# Patient Record
Sex: FEMALE | Race: WHITE | NOT HISPANIC OR LATINO | Employment: OTHER | URBAN - METROPOLITAN AREA
[De-identification: names, ages, dates, MRNs, and addresses within clinical notes are randomized per-mention and may not be internally consistent; named-entity substitution may affect disease eponyms.]

---

## 2018-12-13 ENCOUNTER — HOSPITAL ENCOUNTER (OUTPATIENT)
Dept: MAMMOGRAPHY | Facility: CLINIC | Age: 43
Discharge: HOME | End: 2018-12-13
Attending: OBSTETRICS & GYNECOLOGY

## 2018-12-13 DIAGNOSIS — Z12.31 ENCOUNTER FOR SCREENING MAMMOGRAM FOR BREAST CANCER: ICD-10-CM

## 2020-08-19 ENCOUNTER — HOSPITAL ENCOUNTER (OUTPATIENT)
Dept: MAMMOGRAPHY | Facility: CLINIC | Age: 45
Discharge: HOME | End: 2020-08-19

## 2020-08-19 DIAGNOSIS — Z12.31 VISIT FOR SCREENING MAMMOGRAM: ICD-10-CM

## 2020-10-23 ENCOUNTER — OFFICE VISIT - HEALTHEAST (OUTPATIENT)
Dept: INTERNAL MEDICINE | Facility: CLINIC | Age: 45
End: 2020-10-23

## 2020-10-23 DIAGNOSIS — O24.415 GESTATIONAL DIABETES MELLITUS (GDM) CONTROLLED ON ORAL HYPOGLYCEMIC DRUG, ANTEPARTUM: ICD-10-CM

## 2020-10-23 DIAGNOSIS — E07.9 THYROID LUMP: ICD-10-CM

## 2020-10-23 DIAGNOSIS — Z00.00 ROUTINE GENERAL MEDICAL EXAMINATION AT A HEALTH CARE FACILITY: ICD-10-CM

## 2020-10-23 LAB
ANION GAP SERPL CALCULATED.3IONS-SCNC: 10 MMOL/L (ref 5–18)
BASOPHILS # BLD AUTO: 0 THOU/UL (ref 0–0.2)
BASOPHILS NFR BLD AUTO: 0 % (ref 0–2)
BUN SERPL-MCNC: 15 MG/DL (ref 8–22)
CALCIUM SERPL-MCNC: 9.3 MG/DL (ref 8.5–10.5)
CHLORIDE BLD-SCNC: 105 MMOL/L (ref 98–107)
CO2 SERPL-SCNC: 26 MMOL/L (ref 22–31)
CREAT SERPL-MCNC: 0.74 MG/DL (ref 0.6–1.1)
EOSINOPHIL # BLD AUTO: 0.2 THOU/UL (ref 0–0.4)
EOSINOPHIL NFR BLD AUTO: 2 % (ref 0–6)
ERYTHROCYTE [DISTWIDTH] IN BLOOD BY AUTOMATED COUNT: 11.3 % (ref 11–14.5)
GFR SERPL CREATININE-BSD FRML MDRD: >60 ML/MIN/1.73M2
GLUCOSE BLD-MCNC: 96 MG/DL (ref 70–125)
HCT VFR BLD AUTO: 39.9 % (ref 35–47)
HGB BLD-MCNC: 13.2 G/DL (ref 12–16)
LYMPHOCYTES # BLD AUTO: 2.7 THOU/UL (ref 0.8–4.4)
LYMPHOCYTES NFR BLD AUTO: 33 % (ref 20–40)
MCH RBC QN AUTO: 31.6 PG (ref 27–34)
MCHC RBC AUTO-ENTMCNC: 33.1 G/DL (ref 32–36)
MCV RBC AUTO: 96 FL (ref 80–100)
MONOCYTES # BLD AUTO: 0.4 THOU/UL (ref 0–0.9)
MONOCYTES NFR BLD AUTO: 4 % (ref 2–10)
NEUTROPHILS # BLD AUTO: 4.9 THOU/UL (ref 2–7.7)
NEUTROPHILS NFR BLD AUTO: 61 % (ref 50–70)
PLATELET # BLD AUTO: 203 THOU/UL (ref 140–440)
PMV BLD AUTO: 7.9 FL (ref 7–10)
POTASSIUM BLD-SCNC: 3.9 MMOL/L (ref 3.5–5)
RBC # BLD AUTO: 4.17 MILL/UL (ref 3.8–5.4)
SODIUM SERPL-SCNC: 141 MMOL/L (ref 136–145)
TSH SERPL DL<=0.005 MIU/L-ACNC: 0.57 UIU/ML (ref 0.3–5)
WBC: 8.2 THOU/UL (ref 4–11)

## 2020-10-23 ASSESSMENT — MIFFLIN-ST. JEOR: SCORE: 1417.22

## 2021-01-20 ENCOUNTER — OFFICE VISIT - HEALTHEAST (OUTPATIENT)
Dept: INTERNAL MEDICINE | Facility: CLINIC | Age: 46
End: 2021-01-20

## 2021-01-20 DIAGNOSIS — F41.1 GENERALIZED ANXIETY DISORDER: ICD-10-CM

## 2021-01-20 DIAGNOSIS — G47.01 INSOMNIA DUE TO MEDICAL CONDITION: ICD-10-CM

## 2021-01-20 ASSESSMENT — ANXIETY QUESTIONNAIRES
IF YOU CHECKED OFF ANY PROBLEMS ON THIS QUESTIONNAIRE, HOW DIFFICULT HAVE THESE PROBLEMS MADE IT FOR YOU TO DO YOUR WORK, TAKE CARE OF THINGS AT HOME, OR GET ALONG WITH OTHER PEOPLE: VERY DIFFICULT
4. TROUBLE RELAXING: NEARLY EVERY DAY
3. WORRYING TOO MUCH ABOUT DIFFERENT THINGS: NEARLY EVERY DAY
1. FEELING NERVOUS, ANXIOUS, OR ON EDGE: NEARLY EVERY DAY
5. BEING SO RESTLESS THAT IT IS HARD TO SIT STILL: NEARLY EVERY DAY
4. TROUBLE RELAXING: NEARLY EVERY DAY
6. BECOMING EASILY ANNOYED OR IRRITABLE: NEARLY EVERY DAY
2. NOT BEING ABLE TO STOP OR CONTROL WORRYING: NEARLY EVERY DAY
3. WORRYING TOO MUCH ABOUT DIFFERENT THINGS: NEARLY EVERY DAY
GAD7 TOTAL SCORE: 21
1. FEELING NERVOUS, ANXIOUS, OR ON EDGE: NEARLY EVERY DAY
6. BECOMING EASILY ANNOYED OR IRRITABLE: NEARLY EVERY DAY
2. NOT BEING ABLE TO STOP OR CONTROL WORRYING: NEARLY EVERY DAY
7. FEELING AFRAID AS IF SOMETHING AWFUL MIGHT HAPPEN: NEARLY EVERY DAY
GAD7 TOTAL SCORE: 21
7. FEELING AFRAID AS IF SOMETHING AWFUL MIGHT HAPPEN: NEARLY EVERY DAY
5. BEING SO RESTLESS THAT IT IS HARD TO SIT STILL: NEARLY EVERY DAY

## 2021-01-25 ENCOUNTER — OFFICE VISIT - HEALTHEAST (OUTPATIENT)
Dept: INTERNAL MEDICINE | Facility: CLINIC | Age: 46
End: 2021-01-25

## 2021-01-25 DIAGNOSIS — F41.1 GENERALIZED ANXIETY DISORDER: ICD-10-CM

## 2021-02-11 ENCOUNTER — COMMUNICATION - HEALTHEAST (OUTPATIENT)
Dept: INTERNAL MEDICINE | Facility: CLINIC | Age: 46
End: 2021-02-11

## 2021-02-11 DIAGNOSIS — F41.1 GENERALIZED ANXIETY DISORDER: ICD-10-CM

## 2021-02-11 DIAGNOSIS — G47.01 INSOMNIA DUE TO MEDICAL CONDITION: ICD-10-CM

## 2021-02-12 RX ORDER — CITALOPRAM HYDROBROMIDE 10 MG/1
TABLET ORAL
Qty: 90 TABLET | Refills: 3 | Status: SHIPPED | OUTPATIENT
Start: 2021-02-12

## 2021-02-12 RX ORDER — MIRTAZAPINE 7.5 MG/1
TABLET, FILM COATED ORAL
Qty: 45 TABLET | Refills: 3 | Status: SHIPPED | OUTPATIENT
Start: 2021-02-12

## 2021-05-26 ENCOUNTER — HOSPITAL ENCOUNTER (OUTPATIENT)
Dept: ULTRASOUND IMAGING | Facility: CLINIC | Age: 46
Discharge: HOME | End: 2021-05-26
Payer: COMMERCIAL

## 2021-05-26 DIAGNOSIS — E07.9 THYROID LUMP: ICD-10-CM

## 2021-05-27 ENCOUNTER — RECORDS - HEALTHEAST (OUTPATIENT)
Dept: ADMINISTRATIVE | Facility: CLINIC | Age: 46
End: 2021-05-27

## 2021-05-28 ASSESSMENT — ANXIETY QUESTIONNAIRES
GAD7 TOTAL SCORE: 21
GAD7 TOTAL SCORE: 21

## 2021-06-04 VITALS
SYSTOLIC BLOOD PRESSURE: 111 MMHG | HEIGHT: 67 IN | BODY MASS INDEX: 25.87 KG/M2 | WEIGHT: 164.8 LBS | HEART RATE: 74 BPM | DIASTOLIC BLOOD PRESSURE: 78 MMHG

## 2021-06-12 NOTE — PROGRESS NOTES
FEMALE PREVENTATIVE EXAM    Assessment and Plan:     Patient has been advised of split billing requirements and indicates understanding: Yes    Caitie was seen today for annual exam and establish care.    Routine general medical examination at a health care facility  -     [facility](CBC and Differential)  -     Basic Metabolic Panel    Thyroid lump: Patient has the sensation of there being a lump in her lower anterior neck when she swallows. I cannot feel thyromegaly, but would like to review for thyroid enlargment.  -     Thyroid Stimulating Hormone (TSH)  -     US Thyroid; Future to review for thyroid mass. TSH to review for any thyroid function abnormalities.      Next follow up:  No follow-ups on file.    Immunization Review  Adult Imm Review: No immunizations due today    I discussed the following with the patient:   Adult Healthy Living: Importance of regular exercise  Healthy nutrition  Weight loss referral options        Subjective:   Chief Complaint: Caitie Xiong is an 45 y.o. female here for a preventative health visit.  {Split Bill scripting  The purpose of this visit is to discuss your medical history and prevent health problems before you are sick. You may be responsible for a co-pay, coinsurance, or deductible if your visit today includes services such as checking on a sore throat, having an x-ray or lab test, or treating and evaluating a new or existing condition :[id number]  Patient has been advised of split billing requirements and indicates understanding: Yes    HPI:    Here for annual exam, and to establish primary care.  She says she is a healthy person.  She does feel like there is a lump in her throat when she swallows, for the last month.  She also feels a pulse in her left upper arm.  She has no trouble swallowing.  She has had heartburn in the past, was on omeprazole for a year, stopped this.  She has not had any symptoms since.  She had an IUD placed for heavy periods.      Healthy Habits  Are  you taking a daily aspirin? Yes  Do you typically exercising at least 40 min, 3-4 times per week?  NO  Do you usually eat at least 4 servings of fruit and vegetables a day, include whole grains and fiber and avoid regularly eating high fat foods? Yes  Have you had an eye exam in the past two years? Yes  Do you see a dentist twice per year? Yes  Do you have any concerns regarding sleep? YES, troubles staying asleep     Safety Screen  If you own firearms, are they secured in a locked gun cabinet or with trigger locks? Yes  Do you feel you are safe where you are living?: Yes (10/23/2020  3:22 PM)  Do you feel you are safe in your relationship(s)?: Yes (10/23/2020  3:22 PM)      Review of Systems:  Please see above.  The rest of the review of systems are negative for all systems.     Pap History:   No - age 30-65 PAP every 3 years recommended   Patient has had a papsmear at gynecology 1 1/2 or 2 years ago. She has not had any abnormals. Done at [facility].  She had IUD placed for heavy periods.  This has not really helped her periods.  They are either March, or she has 10 days of periods.  Cancer Screening       Status Date      PAP SMEAR Next Due 10/26/2021      Done 10/26/2018 Gynecologist,          Patient Care Team:  Teresita Oswald MD as PCP - General (Internal Medicine)        History     Reviewed By Date/Time Sections Reviewed    Teresita Oswald MD 10/26/2020 12:54 AM Surgical    Teresita Oswald MD 10/26/2020 12:53 AM Family    Teresita Oswald MD 10/26/2020 12:46 AM Social Documentation    Ishan Khan, DASIA 10/23/2020  3:24 PM Tobacco        Past Medical History:   Diagnosis Date     Gestational diabetes mellitus (GDM) controlled on oral hypoglycemic drug, antepartum        Past Surgical History:   Procedure Laterality Date     cesarian section       INCONTINENCE SURGERY       TUBAL LIGATION       Patient has had her tubes tied.  She had a bladder sling after her  "son was born.    Social History     Social History Narrative    She has 3 children, 16, 14, 10.  She is , she lives with her .  She is an RN, works at [employer].     Social History     Social History Narrative    She has 3 children, 16, 14, 10.  She is , she lives with her .  She is an RN, works at [employer]. Never smoker, couple of glasses of wine a week.  She has a muscles and no significant shift.     Family History   Problem Relation Age of Onset     Breast cancer Paternal Aunt 55     Lung cancer Paternal Grandmother      Allergies   Allergen Reactions     Shellfish Containing Products      Scratchy throat, GI problems         Objective:   Vital Signs:   Visit Vitals  /78   Pulse 74   Ht 5' 6.5\" (1.689 m)   Wt 164 lb 12.8 oz (74.8 kg)   LMP 10/16/2020 (Exact Date)   BMI 26.20 kg/m         PHYSICAL EXAM  She is interactive and alert, not distressed.  I cannot feel any thyromegaly, she does not seem to have retrosternal extension.  No cervical lymphadenopathy.  Pupils equal and react to light.  Chest is clear, no crackles, no wheezes.  Normal heart sounds, no murmurs.  Abdomen is soft and nontender, no organomegaly or masses.  No peripheral edema.  No swelling or warmth of joints.  Normal gait.  No rash.  Affect euthymic. No abnormality in her pulses.      The ASCVD Risk score (Cristina GILLIAN Jr., et al., 2013) failed to calculate for the following reasons:    Cannot find a previous HDL lab    Cannot find a previous total cholesterol lab         Medication List      as of October 23, 2020 11:59 PM     You have not been prescribed any medications.         Additional Screenings Completed Today:     "

## 2021-06-14 NOTE — PROGRESS NOTES
Caitie Xiong is a 45 y.o. female who is being evaluated via a billable telephone visit.      What phone number would you like to be contacted at? [phone]  How would you like to obtain your AVS? AVS Preference: Rohith.  Assessment & Plan     Caitie was seen today for anxiety.    Diagnoses and all orders for this visit:    Insomnia due to medical condition, patient is having trouble sleeping at night due to anxiety.  Because she has not been able to sleep, I have decided to give her mirtazapine, 3.75 mg at bedtime, in addition to treating anxiety with Citalopram as described below.  -     mirtazapine (REMERON) 3.75 mg; Take 1 half-tablet (3.75 mg total) by mouth at bedtime.    Generalized anxiety disorder, worsening over several months, significantly affecting her ability to undertake activities of daily living and instrumental activities of daily living.  We will try some citalopram 10 mg in the morning.  Discussed with her that she should switch this to nighttime, if she feels sleepy with it.  Continue to take it, and not stop it, let me know if she has any problems with it.  Other side effects, such as GI side effects discussed with patient.  -     citalopram (CELEXA) 10 MG tablet; Take 1 tablet (10 mg total) by mouth every morning.      No follow-ups on file. Follow up virtual 1/22/2021.    Teresita Oswald MD  [facility]     Caitie Xiong is 45 y.o. and presents to clinic today for the following health issues.    Been having a lot of anxiety since the fall on and off. Maybe earlier was feeling like she was having panic attacks. Was able to calm her self down. Lately. Lot of anxiety, not sleeping. Woke up this morning, and her  said she needs to talk with someone.    Not happy with her job, lost a job that she loved.      Last month, can't deal with it. Affecting her family. Fall asleep anywhere, feel like she is in fight or flight mode, pass  out until 11 or midnight. Then from midnight until 5 am, mind racing, about work stuff, and what she thinks can go wrong, tries to think about posiive stuff but doesn't sleep until 5 am. Vicious cycel.     Don't have energy, but doesn't feel depressed.     HPI   1.59-2.23 telephone call.  Patient established primary care with me 10/23/2020.    Results for DOMINIQUE HUDDLESTON (MRN [id number]) as of 1/20/2021 14:03   Ref. Range 10/23/2020 16:24   Sodium Latest Ref Range: 136 - 145 mmol/L 141   Potassium Latest Ref Range: 3.5 - 5.0 mmol/L 3.9   Chloride Latest Ref Range: 98 - 107 mmol/L 105   CO2 Latest Ref Range: 22 - 31 mmol/L 26   Anion Gap, Calculation Latest Ref Range: 5 - 18 mmol/L 10   BUN Latest Ref Range: 8 - 22 mg/dL 15   Creatinine Latest Ref Range: 0.60 - 1.10 mg/dL 0.74   GFR MDRD Af Amer Latest Ref Range: >60 mL/min/1.73m2 >60   GFR MDRD Non Af Amer Latest Ref Range: >60 mL/min/1.73m2 >60   Calcium Latest Ref Range: 8.5 - 10.5 mg/dL 9.3   Glucose Latest Ref Range: 70 - 125 mg/dL 96   TSH Latest Ref Range: 0.30 - 5.00 uIU/mL 0.57     Rest of the HPI as above.    Review of Systems   Depression and anxiety as described above.        Objective       Vitals:  No vitals were obtained today due to virtual visit.    Physical Exam  Patient is crying on the telephone.       No data recorded  JUDAH-7 she scored 21/21: Answered questions online.      JUDAH-7 was filled out by the patient in an e visit.   Is Depression one of your symptoms? No   Is Anxiety one of your symptoms? Yes   Feeling nervous, anxious, or on edge Nearly every day   Not being able to stop or control worrying Nearly every day   Worrying too much about different things Nearly every day   Trouble relaxing Nearly every day   Being so restless that it's hard to sit still Nearly every day   Becoming easily annoyed or irritable Nearly every day   Feeling afraid as if something awful might happen Nearly every day   Have your symptoms changed since your last  visit? Worsened   Have you had a significant life event (job loss, death in family, divorce, etc)? No   Indicate any current substance use: None   Do you experience anxiety attacks or panic attacks? Yes   How often do you have anxiety attacks or panic attacks? lately its been weekly   How regularly do you take your depression/anxiety medications? I don't have any medications       Phone call duration: 20 minutes

## 2021-06-14 NOTE — PROGRESS NOTES
Caitie Xiong is a 45 y.o. female who is being evaluated via a billable telephone visit.      What phone number would you like to be contacted at? [phone]  How would you like to obtain your AVS? AVS Preference: [employer].  Assessment & Plan     Caitie was seen today for anxiety.    Diagnoses and all orders for this visit:    Generalized anxiety disorder, I saw the patient about 5 days ago, and for a virtual visit, with complaints of anxiety since the fall, which was worsening.  Also a feeling of panic attacks.  Not happy with her job.  Sleep disorder, marked fatigue, due to not being able to sleep.  Decreased energy.  Crying all the time.  I started her on citalopram 10 mg daily, January 20, 2021.  Patient has been taking this at 4 PM.  I have asked her to take this at nighttime, to see if this may help her sleep.  Patient has not started mirtazapine I prescribed to help her with sleep yet, as she is working, and did not wish to have a problem at work.  She will try this, this weekend.      4.53. End telephone call 4.59, documentation to 5.07.    [id number]}     No follow-ups on file. [employer]. Consider BMP on Citalopram (I did not discuss this with the patient today).     Teresita Oswald MD  [facility]     Caitie Xiong is 45 y.o. and presents to clinic today for the following health issues     HPI   Anxiety about the same, but crying is better. Warm and panic hasn't gone away. Not really sleeping at night. Sensitive to meds, so didn't take the Mirtazipine. Switched to 4pm (Citalopram). Therapist tomorrow.  She isn't crying all the time like she was.       Review of Systems  As above.      Objective       Vitals:  No vitals were obtained today due to virtual visit.    Physical Exam   No physical exam as this is a telephone encounter.      Plan is to continue with citalopram 10 mg, and she will let me know if she thinks she needs to go up on this dose to 20  mg.  She is currently taking the Citalopram at 4 PM, as this made her sleepy when she took it in the morning, and I have asked her to take this an hour before bedtime instead.  Hoping that this change will help with her sleep.  She has not tried the mirtazapine, may try the mirtazapine, on the weekend, as she does not wish to be sleepy at work, and is unsure as to how this will affect her.    She will communicate with me via [facility], as to whether she needs an increase in the citalopram, as to whether the mirtazapine is helping or not, and if she needs a further prescription of this.        Phone call duration: 6 minutes (actual telephone call).

## 2021-06-15 NOTE — TELEPHONE ENCOUNTER
Refill Approved    Rx renewed per Medication Renewal Policy. Medication was last renewed on 1/20/21.    Evert Bettencourt, [facility] Connection Triage/Med Refill 2/12/2021     Requested Prescriptions   Pending Prescriptions Disp Refills     citalopram (CELEXA) 10 MG tablet [Pharmacy Med Name: CITALOPRAM HBR 10 MG TABLET] 30 tablet 0     Sig: TAKE 1 TABLET BY MOUTH EVERY DAY IN THE MORNING       [facility] Refill Protocol  Passed - 2/11/2021 11:35 AM        Passed - PCP or prescribing provider visit in last year     Last office visit with prescriber/PCP: Visit date not found OR same dept: Visit date not found OR same specialty: Visit date not found  Last physical: 10/23/2020 Last MTM visit: Visit date not found   Next visit within 3 mo: Visit date not found  Next physical within 3 mo: Visit date not found  Prescriber OR PCP: Teresita Oswald MD  Last diagnosis associated with med order: 1. Generalized anxiety disorder  - citalopram (CELEXA) 10 MG tablet [Pharmacy Med Name: CITALOPRAM HBR 10 MG TABLET]; TAKE 1 TABLET BY MOUTH EVERY DAY IN THE MORNING  Dispense: 30 tablet; Refill: 0    2. Insomnia due to medical condition  - mirtazapine (REMERON) 7.5 MG tablet [Pharmacy Med Name: MIRTAZAPINE 7.5 MG TABLET]; TAKE 1/2 TABLET BY MOUTH AT BEDTIME  Dispense: 15 tablet; Refill: 0    If protocol passes may refill for 12 months if within 3 months of last provider visit (or a total of 15 months).                mirtazapine (REMERON) 7.5 MG tablet [Pharmacy Med Name: MIRTAZAPINE 7.5 MG TABLET] 15 tablet 0     Sig: TAKE 1/2 TABLET BY MOUTH AT BEDTIME       Tricyclics/Misc Antidepressant/Antianxiety Meds Refill Protocol Passed - 2/11/2021 11:35 AM        Passed - PCP or prescribing provider visit in last year     Last office visit with prescriber/PCP: Visit date not found OR same dept: Visit date not found OR same specialty: Visit date not found  Last physical: 10/23/2020 Last MTM visit: Visit date not found   Next visit within 3 mo: Visit  date not found  Next physical within 3 mo: Visit date not found  Prescriber OR PCP: Teresita Oswald MD  Last diagnosis associated with med order: 1. Generalized anxiety disorder  - citalopram (CELEXA) 10 MG tablet [Pharmacy Med Name: CITALOPRAM HBR 10 MG TABLET]; TAKE 1 TABLET BY MOUTH EVERY DAY IN THE MORNING  Dispense: 30 tablet; Refill: 0    2. Insomnia due to medical condition  - mirtazapine (REMERON) 7.5 MG tablet [Pharmacy Med Name: MIRTAZAPINE 7.5 MG TABLET]; TAKE 1/2 TABLET BY MOUTH AT BEDTIME  Dispense: 15 tablet; Refill: 0    If protocol passes may refill for 12 months if within 3 months of last provider visit (or a total of 15 months).

## 2021-06-20 ENCOUNTER — HEALTH MAINTENANCE LETTER (OUTPATIENT)
Age: 46
End: 2021-06-20

## 2021-06-25 NOTE — PROGRESS NOTES
The ultrasound of your thyroid gland, shows very small cysts, 5 mm or less in size, which are not concerning on the right, and a 1 cm nodule, on the left, which does not have any concerning features.    Teresita Oswald MD

## 2021-10-11 ENCOUNTER — HEALTH MAINTENANCE LETTER (OUTPATIENT)
Age: 46
End: 2021-10-11

## 2021-12-05 ENCOUNTER — HEALTH MAINTENANCE LETTER (OUTPATIENT)
Age: 46
End: 2021-12-05

## 2022-01-25 ENCOUNTER — HOSPITAL ENCOUNTER (OUTPATIENT)
Dept: MAMMOGRAPHY | Facility: CLINIC | Age: 47
Discharge: HOME | End: 2022-01-25
Attending: OBSTETRICS & GYNECOLOGY | Admitting: OBSTETRICS & GYNECOLOGY
Payer: COMMERCIAL

## 2022-01-25 DIAGNOSIS — Z12.31 VISIT FOR SCREENING MAMMOGRAM: ICD-10-CM

## 2022-01-25 PROCEDURE — 77067 SCR MAMMO BI INCL CAD: CPT

## 2022-09-25 ENCOUNTER — HEALTH MAINTENANCE LETTER (OUTPATIENT)
Age: 47
End: 2022-09-25

## 2022-11-07 ENCOUNTER — LAB REQUISITION (OUTPATIENT)
Dept: LAB | Facility: CLINIC | Age: 47
End: 2022-11-07

## 2022-11-07 DIAGNOSIS — R63.5 ABNORMAL WEIGHT GAIN: ICD-10-CM

## 2022-11-07 DIAGNOSIS — Z13.220 ENCOUNTER FOR SCREENING FOR LIPOID DISORDERS: ICD-10-CM

## 2022-11-07 DIAGNOSIS — N92.6 IRREGULAR MENSTRUATION, UNSPECIFIED: ICD-10-CM

## 2022-11-07 LAB
ERYTHROCYTE [DISTWIDTH] IN BLOOD BY AUTOMATED COUNT: 13.3 % (ref 10–15)
HCT VFR BLD AUTO: 42.6 % (ref 35–47)
HGB BLD-MCNC: 13.7 G/DL (ref 11.7–15.7)
HOLD SPECIMEN: NORMAL
MCH RBC QN AUTO: 31.9 PG (ref 26.5–33)
MCHC RBC AUTO-ENTMCNC: 32.2 G/DL (ref 31.5–36.5)
MCV RBC AUTO: 99 FL (ref 78–100)
PLATELET # BLD AUTO: 160 10E3/UL (ref 150–450)
RBC # BLD AUTO: 4.3 10E6/UL (ref 3.8–5.2)
WBC # BLD AUTO: 8.3 10E3/UL (ref 4–11)

## 2022-11-07 PROCEDURE — 80061 LIPID PANEL: CPT | Performed by: OBSTETRICS & GYNECOLOGY

## 2022-11-07 PROCEDURE — 82374 ASSAY BLOOD CARBON DIOXIDE: CPT | Performed by: OBSTETRICS & GYNECOLOGY

## 2022-11-07 PROCEDURE — 83036 HEMOGLOBIN GLYCOSYLATED A1C: CPT | Performed by: OBSTETRICS & GYNECOLOGY

## 2022-11-07 PROCEDURE — 84443 ASSAY THYROID STIM HORMONE: CPT | Performed by: OBSTETRICS & GYNECOLOGY

## 2022-11-07 PROCEDURE — 85027 COMPLETE CBC AUTOMATED: CPT | Performed by: OBSTETRICS & GYNECOLOGY

## 2022-11-08 LAB
ALBUMIN SERPL BCG-MCNC: 4.5 G/DL (ref 3.5–5.2)
ALP SERPL-CCNC: 80 U/L (ref 35–104)
ALT SERPL W P-5'-P-CCNC: 16 U/L (ref 10–35)
ANION GAP SERPL CALCULATED.3IONS-SCNC: 12 MMOL/L (ref 7–15)
AST SERPL W P-5'-P-CCNC: 21 U/L (ref 10–35)
BILIRUB SERPL-MCNC: 0.3 MG/DL
BUN SERPL-MCNC: 21.1 MG/DL (ref 6–20)
CALCIUM SERPL-MCNC: 9.1 MG/DL (ref 8.6–10)
CHLORIDE SERPL-SCNC: 101 MMOL/L (ref 98–107)
CHOLEST SERPL-MCNC: 151 MG/DL
CREAT SERPL-MCNC: 0.63 MG/DL (ref 0.51–0.95)
DEPRECATED HCO3 PLAS-SCNC: 23 MMOL/L (ref 22–29)
GFR SERPL CREATININE-BSD FRML MDRD: >90 ML/MIN/1.73M2
GLUCOSE SERPL-MCNC: 88 MG/DL (ref 70–99)
HBA1C MFR BLD: 5.4 %
HDLC SERPL-MCNC: 58 MG/DL
LDLC SERPL CALC-MCNC: 78 MG/DL
NONHDLC SERPL-MCNC: 93 MG/DL
POTASSIUM SERPL-SCNC: 4.1 MMOL/L (ref 3.4–5.3)
PROT SERPL-MCNC: 6.9 G/DL (ref 6.4–8.3)
SODIUM SERPL-SCNC: 136 MMOL/L (ref 136–145)
TRIGL SERPL-MCNC: 73 MG/DL
TSH SERPL DL<=0.005 MIU/L-ACNC: 1.15 UIU/ML (ref 0.3–4.2)

## 2023-01-30 ENCOUNTER — HEALTH MAINTENANCE LETTER (OUTPATIENT)
Age: 48
End: 2023-01-30

## 2023-01-31 ENCOUNTER — HOSPITAL ENCOUNTER (OUTPATIENT)
Dept: MAMMOGRAPHY | Facility: CLINIC | Age: 48
Discharge: HOME | End: 2023-01-31
Attending: OBSTETRICS & GYNECOLOGY | Admitting: OBSTETRICS & GYNECOLOGY
Payer: COMMERCIAL

## 2023-01-31 DIAGNOSIS — Z12.31 VISIT FOR SCREENING MAMMOGRAM: ICD-10-CM

## 2023-01-31 PROCEDURE — 77067 SCR MAMMO BI INCL CAD: CPT

## 2024-01-11 ENCOUNTER — LAB REQUISITION (OUTPATIENT)
Dept: LAB | Facility: CLINIC | Age: 49
End: 2024-01-11

## 2024-01-11 DIAGNOSIS — Z13.220 ENCOUNTER FOR SCREENING FOR LIPOID DISORDERS: ICD-10-CM

## 2024-01-11 DIAGNOSIS — R63.5 ABNORMAL WEIGHT GAIN: ICD-10-CM

## 2024-01-11 DIAGNOSIS — R61 GENERALIZED HYPERHIDROSIS: ICD-10-CM

## 2024-01-11 DIAGNOSIS — R53.83 OTHER FATIGUE: ICD-10-CM

## 2024-01-11 LAB
CHOLEST SERPL-MCNC: 165 MG/DL
ERYTHROCYTE [DISTWIDTH] IN BLOOD BY AUTOMATED COUNT: 12.8 % (ref 10–15)
FASTING STATUS PATIENT QL REPORTED: NO
HCT VFR BLD AUTO: 42.2 % (ref 35–47)
HDLC SERPL-MCNC: 61 MG/DL
HGB BLD-MCNC: 13.7 G/DL (ref 11.7–15.7)
LDLC SERPL CALC-MCNC: 86 MG/DL
MCH RBC QN AUTO: 31.3 PG (ref 26.5–33)
MCHC RBC AUTO-ENTMCNC: 32.5 G/DL (ref 31.5–36.5)
MCV RBC AUTO: 96 FL (ref 78–100)
NONHDLC SERPL-MCNC: 104 MG/DL
PLATELET # BLD AUTO: 223 10E3/UL (ref 150–450)
RBC # BLD AUTO: 4.38 10E6/UL (ref 3.8–5.2)
TRIGL SERPL-MCNC: 90 MG/DL
WBC # BLD AUTO: 7.6 10E3/UL (ref 4–11)

## 2024-01-11 PROCEDURE — 80061 LIPID PANEL: CPT | Performed by: OBSTETRICS & GYNECOLOGY

## 2024-01-11 PROCEDURE — 85027 COMPLETE CBC AUTOMATED: CPT | Performed by: OBSTETRICS & GYNECOLOGY

## 2024-01-11 PROCEDURE — 84443 ASSAY THYROID STIM HORMONE: CPT | Performed by: OBSTETRICS & GYNECOLOGY

## 2024-01-11 PROCEDURE — 83036 HEMOGLOBIN GLYCOSYLATED A1C: CPT | Performed by: OBSTETRICS & GYNECOLOGY

## 2024-01-11 PROCEDURE — 83001 ASSAY OF GONADOTROPIN (FSH): CPT | Performed by: OBSTETRICS & GYNECOLOGY

## 2024-01-11 PROCEDURE — 82607 VITAMIN B-12: CPT | Performed by: OBSTETRICS & GYNECOLOGY

## 2024-01-12 LAB
FSH SERPL IRP2-ACNC: 5.8 MIU/ML
TSH SERPL DL<=0.005 MIU/L-ACNC: 1.26 UIU/ML (ref 0.3–4.2)
VIT B12 SERPL-MCNC: 1079 PG/ML (ref 232–1245)

## 2024-01-13 LAB — HBA1C MFR BLD: 5.8 %

## 2024-03-02 ENCOUNTER — HEALTH MAINTENANCE LETTER (OUTPATIENT)
Age: 49
End: 2024-03-02

## 2025-03-15 ENCOUNTER — HEALTH MAINTENANCE LETTER (OUTPATIENT)
Age: 50
End: 2025-03-15

## 2025-04-05 ENCOUNTER — HEALTH MAINTENANCE LETTER (OUTPATIENT)
Age: 50
End: 2025-04-05